# Patient Record
(demographics unavailable — no encounter records)

---

## 2025-02-12 NOTE — CONSULT LETTER
[Dear  ___] : Dear  [unfilled], [Consult Letter:] : I had the pleasure of evaluating your patient, [unfilled]. [Please see my note below.] : Please see my note below. [Consult Closing:] : Thank you very much for allowing me to participate in the care of this patient.  If you have any questions, please do not hesitate to contact me. [Sincerely,] : Sincerely, [FreeTextEntry3] : Israel Beltran MD\par   [DrJonny  ___] : Dr. HERRERA

## 2025-02-12 NOTE — ASSESSMENT
[FreeTextEntry1] : Mr. Colby is an 84-year-old with a longstanding idiopathic axonal sensorimotor polyneuropathy.  He remains on a gluten-free diet.  He presents with persistent and possibly worsening gait difficulties.  His presentation is likely due to central and peripheral nervous system and spinal issues.  The bilateral end gaze nystagmus suggests central cerebellar dysfunction.  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  I reviewed an MRI of the brain performed several years ago.  That study revealed mild microvascular changes.  MRI of the lumbar spine revealed moderate to severe L2-3 spinal stenosis.  He does not appear myelopathic.  I suggested updated MRIs of the brain and cervical spine.  If desired, this can be followed by an F dopa PET scan.  He prefers an empiric trial of carbidopa/levodopa.  A titration schedule was provided.  I also provided him with a prescription for physical therapy for ataxia.  Further management will depend upon these results and her clinical course.  I suggested close telephone and office follow-up.

## 2025-02-12 NOTE — HISTORY OF PRESENT ILLNESS
[FreeTextEntry1] : Mr. Brent Colby returned to the office having been last seen on April 8, 2024. He is an 84-year-old left-handed gentleman with a slowly progressive chronic axonal sensory and motor axonal polyneuropathy. He is on a gluten-free diet for possible celiac disease. Hemoglobin A1c was mildly elevated.  He has undergone serial CTs of the chest for a nonspecific lesion.  No other intervention has been suggested.  At his June 2023 visit, Mr. Colby complained of soreness of his feet with slight numbness.  His hands were unaffected.  He denied spinal pain.  He complained of nocturia.  He deniedd any cognitive difficulties.  At his April 2024 visit, Mrs. Colby felt that his hands and feet were very cold all the time.  He complained of pruritus of his distal lower extremities.  He contracted Charline-Barr virus in September 2023 but recovered.  He then had an upper respiratory process in the early winter which caused him much fatigue.  He complained of recent low back pain.  He complained of imbalance.  His feet were numb but not painful.  I was concerned about his progressive gait difficulties.  I question whether there was a central nervous system contribution possibly Parkinson's disease.  I suggested MRIs of the brain, cervical and lumbar spine and if unremarkable an F dopa PET scan.  He was lost to follow-up.  He contracted COVID-19 in July 2024 was hospitalized overnight due to low oxygen levels.  He complains of feeling off balance sometimes requiring the use of a cane.  He has not fallen.  His handwriting is poor but he attributes this to severe Dupuytren's contractures.  He complains of mild postural lightheadedness.  His feet are numb but not painful.  He has numbness of both fourth and fifth fingers as well.  He denies tremor.  He complains of morning back pain and stiffness.  Past surgical history is notable for bilateral knee replacements, cataract extractions, left corneal transplant, CABG and repair of Dupuytren's contractures.  He suffers from hyperlipidemia, prediabetes, osteoporosis, coronary disease, GERD and celiac disease.  He has allergies to penicillin and Z-Elmer.  Medications include Reclast, Toprol, pantoprazole, Repatha, baby aspirin, vitamin D, coenzyme Q10, vitamin C and multivitamin.  He is a non-smoker and social drinker.  He is a retired .  Family history is notable for a brother with congenital neurologic disorder.

## 2025-02-12 NOTE — PHYSICAL EXAM
[FreeTextEntry1] : Constitutional:  Patient was well-developed, well-nourished and in no acute distress.   Head:  Normocephalic, atraumatic. Tympanic membranes were not examined.   Neck:  Supple with full range of motion.   Cardiovascular:  Cardiac rhythm was regular without murmur. There were no carotid bruits. Peripheral pulses were full and symmetric.   Respiratory:  Lungs were clear.   Abdomen:  Soft and nontender.   Spine: Spine was nontender.  There was no pain on straight leg raising.  Skin:  There were no rashes.   NEUROLOGICAL EXAMINATION:  Mental Status: Patient was alert and oriented. Speech was fluent. There was no dysarthria.  He was mildly hypophonic.  Handwriting was not micrographic.  Cranial Nerves:   II: He could finger count bilaterally.  Pupils were surgical but reactive. Visual fields were full.  Fundi were poorly visualized.  III, IV, VI:  Eye movements were full with bilateral horizontal end gaze nystagmus.  There was no vertical nystagmus.  V: Facial sensation was intact.   VII: Facial strength was normal.  There was no significant masking.  VIII: Hearing was equal.   IX, X: Palatal movement was normal. Phonation was normal.   XI: Sternocleidomastoids and trapezii were normal.   XII: Tongue was midline and movements slow. There was no lingual atrophy or fasciculations.   Motor Examination: Muscle bulk, tone and strength were normal in the upper extremity.  He had severe Dupuytren's contractures involving his fifth fingers.  There was no tremor.  Fine finger and rapid alternating movements were mildly slow.  Tone was slightly increased in the lower extremities with full strength.  Sensory Examination: Vibration sense was absent in the feet.  There was shading of pinprick in a stocking and glove distribution.  Joint position sense was mildly impaired in the right foot.  Reflexes: DTRs were trace at the knees and absent elsewhere.  Plantar Responses: Plantar responses were flexor.   Coordination/Cerebellar Function: There was no dysmetria on finger to nose testing.  There was mild bilateral heel-to-shin dysmetria.  Gait/Stance: Posture was stooped.  Strides were short and turns decomposed.  He was very unsteady on Romberg testing and could not tandem.